# Patient Record
Sex: FEMALE | Race: WHITE | URBAN - NONMETROPOLITAN AREA
[De-identification: names, ages, dates, MRNs, and addresses within clinical notes are randomized per-mention and may not be internally consistent; named-entity substitution may affect disease eponyms.]

---

## 2017-10-24 ENCOUNTER — MEDICAL CORRESPONDENCE (OUTPATIENT)
Dept: HEALTH INFORMATION MANAGEMENT | Facility: HOSPITAL | Age: 72
End: 2017-10-24

## 2017-11-15 ENCOUNTER — TRANSFERRED RECORDS (OUTPATIENT)
Dept: HEALTH INFORMATION MANAGEMENT | Facility: HOSPITAL | Age: 72
End: 2017-11-15

## 2017-11-22 ENCOUNTER — MEDICAL CORRESPONDENCE (OUTPATIENT)
Dept: HEALTH INFORMATION MANAGEMENT | Facility: HOSPITAL | Age: 72
End: 2017-11-22

## 2017-11-28 ENCOUNTER — MEDICAL CORRESPONDENCE (OUTPATIENT)
Dept: HEALTH INFORMATION MANAGEMENT | Facility: HOSPITAL | Age: 72
End: 2017-11-28

## 2017-12-09 DIAGNOSIS — Z00.00 HEALTH CARE MAINTENANCE: Primary | ICD-10-CM

## 2017-12-12 ENCOUNTER — MEDICAL CORRESPONDENCE (OUTPATIENT)
Dept: HEALTH INFORMATION MANAGEMENT | Facility: HOSPITAL | Age: 72
End: 2017-12-12

## 2017-12-12 RX ORDER — MULTIVITAMIN WITH FOLIC ACID 400 MCG
TABLET ORAL
Qty: 30 TABLET | Refills: 3 | Status: SHIPPED | OUTPATIENT
Start: 2017-12-12 | End: 2018-01-09

## 2017-12-12 NOTE — TELEPHONE ENCOUNTER
Multi Vit  No office visit.  No medications on her med list.  I see you signed a home care and hospice order in media.  Please advise if you are filling medications for this patient.  Thank you.

## 2017-12-13 PROBLEM — Z79.4 TYPE 2 DIABETES MELLITUS WITH CHRONIC KIDNEY DISEASE ON CHRONIC DIALYSIS, WITH LONG-TERM CURRENT USE OF INSULIN (H): Status: ACTIVE | Noted: 2017-12-13

## 2017-12-13 PROBLEM — E78.2 MIXED HYPERLIPIDEMIA: Status: ACTIVE | Noted: 2017-12-13

## 2017-12-13 PROBLEM — N18.6 TYPE 2 DIABETES MELLITUS WITH CHRONIC KIDNEY DISEASE ON CHRONIC DIALYSIS, WITH LONG-TERM CURRENT USE OF INSULIN (H): Status: ACTIVE | Noted: 2017-12-13

## 2017-12-13 PROBLEM — I48.0 PAROXYSMAL ATRIAL FIBRILLATION (H): Status: ACTIVE | Noted: 2017-12-13

## 2017-12-13 PROBLEM — E03.9 ACQUIRED HYPOTHYROIDISM: Status: ACTIVE | Noted: 2017-12-13

## 2017-12-13 PROBLEM — I10 BENIGN ESSENTIAL HYPERTENSION: Status: ACTIVE | Noted: 2017-12-13

## 2017-12-13 PROBLEM — I25.10 CORONARY ARTERY DISEASE INVOLVING NATIVE CORONARY ARTERY OF NATIVE HEART WITHOUT ANGINA PECTORIS: Status: ACTIVE | Noted: 2017-12-13

## 2017-12-13 PROBLEM — Z99.2 TYPE 2 DIABETES MELLITUS WITH CHRONIC KIDNEY DISEASE ON CHRONIC DIALYSIS, WITH LONG-TERM CURRENT USE OF INSULIN (H): Status: ACTIVE | Noted: 2017-12-13

## 2017-12-13 PROBLEM — F41.1 GAD (GENERALIZED ANXIETY DISORDER): Status: ACTIVE | Noted: 2017-12-13

## 2017-12-13 PROBLEM — E11.22 TYPE 2 DIABETES MELLITUS WITH CHRONIC KIDNEY DISEASE ON CHRONIC DIALYSIS, WITH LONG-TERM CURRENT USE OF INSULIN (H): Status: ACTIVE | Noted: 2017-12-13

## 2017-12-13 PROBLEM — I50.41 ACUTE COMBINED SYSTOLIC AND DIASTOLIC HEART FAILURE (H): Status: ACTIVE | Noted: 2017-12-13

## 2017-12-13 PROBLEM — Z78.9 NURSING HOME RESIDENT: Status: ACTIVE | Noted: 2017-12-13

## 2017-12-13 PROBLEM — E66.01 MORBID OBESITY (H): Status: ACTIVE | Noted: 2017-12-13

## 2017-12-13 PROBLEM — G47.33 OSA (OBSTRUCTIVE SLEEP APNEA): Status: ACTIVE | Noted: 2017-12-13

## 2017-12-13 NOTE — PROGRESS NOTES
HISTORY OF PRESENT ILLNESS:  Delmis is a 72 year old female (1945)  resident of [facility]  who is being seen today for a routine 30 day follow up. She was admitted from [facility] after developing acute combined heart failure.  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  Extensive records are reviewed.. Patient offers no other complaint.  Staff notes no other issues.    Current medications, allergies, and interdisciplinary care plan are reviewed.      Patient Active Problem List    Diagnosis Date Noted     CHD (coronary heart disease) 12/13/2017     Priority: Medium     Paroxysmal atrial fibrillation (H) 12/13/2017     Priority: Medium     Acute combined systolic and diastolic heart failure (H) 12/13/2017     Priority: Medium     Type 2 diabetes mellitus with chronic kidney disease on chronic dialysis, with long-term current use of insulin (H) 12/13/2017     Priority: Medium     Acquired hypothyroidism 12/13/2017     Priority: Medium     Morbid obesity (H) 12/13/2017     Priority: Medium     MANDA (generalized anxiety disorder) 12/13/2017     Priority: Medium     WIN (obstructive sleep apnea) 12/13/2017     Priority: Medium     Benign essential hypertension 12/13/2017     Priority: Medium     Dyslipidemia 12/13/2017     Priority: Medium     Nursing Home Visit 12/13/2017     Priority: Low          Social History     Social History     Marital status:      Spouse name: N/A     Number of children: N/A     Years of education: N/A     Occupational History     Not on file.     Social History Main Topics     Smoking status: Not on file     Smokeless tobacco: Not on file     Alcohol use Not on file     Drug use: Not on file     Sexual activity: Not on file     Other Topics Concern     Not on file     Social History Narrative        Current Outpatient Prescriptions   Medication Sig     Multiple Vitamin  (TAB-A-BEKA) TABS TAKE 1 TABLET DAILY BY MOUTH     No current facility-administered medications for this visit.        Allergies not on file    I have reviewed the care plan and do agree with the plan.      ROS:  No chest pain, fever, chills, headache, nausea, vomiting, dysuria, or changes in bowel habits.  Appetite is normal.  No pain noted.          OBJECTIVE:  There were no vitals taken for this visit.    GENERAL:  Chronically ill appearing, alert, and in no acute distress  RESP:  Lungs clear.  No rales, rhonchi, or wheezing  CV:  IRRR.  S1 S2 without murmur. No clicks or rubs.  SKIN:  Age-related changes.  No suspicious lesions or rashes.  PSYCH:  Mentation intact, affect bright, and orientation not assessed.  EXTREM:  2+ edema.  Pulses palpable.          Lab/Diagnostic data:    Reviewed    ASSESSMENT/ORDERS:  Nursing Home Visit  Above issues stable.  No changes in current medications or care plan.      Total time spent with patient visit was 25 min including patient visit, review of pertinent clinical information, and treatment plan.      Alexi Youssef MD

## 2017-12-27 ENCOUNTER — NURSING HOME VISIT (OUTPATIENT)
Dept: FAMILY MEDICINE | Facility: OTHER | Age: 72
End: 2017-12-27
Payer: COMMERCIAL

## 2017-12-27 DIAGNOSIS — Z78.9 NURSING HOME RESIDENT: Primary | ICD-10-CM

## 2017-12-27 NOTE — MR AVS SNAPSHOT
"              After Visit Summary   2017    Delmis West    MRN: [id number]           Patient Information     Date Of Birth          1945        Visit Information        Provider Department      2017 4:37 AM Alexi Youssef MD [facility]        [facility]     Nursing Home Visit    -  1       Follow-ups after your visit        Who to contact     If you have questions or need follow up information about today's clinic visit or your schedule please contact [facility] directly at [phone].  Normal or non-critical lab and imaging results will be communicated to you by MyChart, letter or phone within 4 business days after the clinic has received the results. If you do not hear from us within 7 days, please contact the clinic through [employer] or phone. If you have a critical or abnormal lab result, we will notify you by phone as soon as possible.  Submit refill requests through [employer] or call your pharmacy and they will forward the refill request to us. Please allow 3 business days for your refill to be completed.          Additional Information About Your Visit        MyChart Information     [employer] lets you send messages to your doctor, view your test results, renew your prescriptions, schedule appointments and more. To sign up, go to [email or web address] . Click on \"Log in\" on the left side of the screen, which will take you to the Welcome page. Then click on \"Sign up Now\" on the right side of the page.     You will be asked to enter the access code listed below, as well as some personal information. Please follow the directions to create your username and password.     Your access code is: [email or web address]  Expires: 3/13/2018  4:51 AM     Your access code will  in 90 days. If you need help or a new code, please call your [facility] or [phone].        Care EveryWhere ID     This is your Care EveryWhere ID. This could be used by other " organizations to access your [place] medical records  [id number]         Blood Pressure from Last 3 Encounters:   No data found for BP    Weight from Last 3 Encounters:   No data found for Wt              Today, you had the following     No orders found for display       Primary Care Provider Office Phone # Fax #    Alexi Youssef [phone]       [facility] [address]  [facility] [address]        Equal Access to Services     [facility] : Hadii aad ku hadasho Soomaali, waaxda luqadaha, qaybta kaalmada adeegyada, waxay idiin hayaan adeeg kharash la'aan . So [facility] [phone].    ATENCIÓN: Si habla español, tiene a salinas disposición servicios gratuitos de asistencia lingüística. Llame al [phone].    We comply with applicable federal civil rights laws and [place] laws. We do not discriminate on the basis of race, color, national origin, age, disability, sex, sexual orientation, or gender identity.            Thank you!     Thank you for choosing [facility]  for your care. Our goal is always to provide you with excellent care. Hearing back from our patients is one way we can continue to improve our services. Please take a few minutes to complete the written survey that you may receive in the mail after your visit with us. Thank you!             Your Updated Medication List - Protect others around you: Learn how to safely use, store and throw away your medicines at www.disposemymeds.org.          This list is accurate as of: 12/13/17  4:51 AM.  Always use your most recent med list.                   Brand Name Dispense Instructions for use Diagnosis    TAB-A-BEKA Tabs     30 tablet    TAKE 1 TABLET DAILY BY MOUTH    Health care maintenance

## 2018-01-09 DIAGNOSIS — K21.9 GASTROESOPHAGEAL REFLUX DISEASE, ESOPHAGITIS PRESENCE NOT SPECIFIED: ICD-10-CM

## 2018-01-09 DIAGNOSIS — E03.9 ACQUIRED HYPOTHYROIDISM: ICD-10-CM

## 2018-01-09 DIAGNOSIS — I10 BENIGN ESSENTIAL HYPERTENSION: ICD-10-CM

## 2018-01-09 DIAGNOSIS — Z00.00 HEALTH CARE MAINTENANCE: ICD-10-CM

## 2018-01-09 DIAGNOSIS — E11.42 DIABETIC POLYNEUROPATHY ASSOCIATED WITH TYPE 2 DIABETES MELLITUS (H): ICD-10-CM

## 2018-01-09 DIAGNOSIS — I50.41 ACUTE COMBINED SYSTOLIC AND DIASTOLIC HEART FAILURE (H): ICD-10-CM

## 2018-01-09 DIAGNOSIS — I25.119 CORONARY ARTERY DISEASE WITH ANGINA PECTORIS, UNSPECIFIED VESSEL OR LESION TYPE, UNSPECIFIED WHETHER NATIVE OR TRANSPLANTED HEART (H): Primary | ICD-10-CM

## 2018-01-10 DIAGNOSIS — N18.6 TYPE 2 DIABETES MELLITUS WITH CHRONIC KIDNEY DISEASE ON CHRONIC DIALYSIS, WITHOUT LONG-TERM CURRENT USE OF INSULIN (H): Primary | ICD-10-CM

## 2018-01-10 DIAGNOSIS — Z99.2 TYPE 2 DIABETES MELLITUS WITH CHRONIC KIDNEY DISEASE ON CHRONIC DIALYSIS, WITHOUT LONG-TERM CURRENT USE OF INSULIN (H): Primary | ICD-10-CM

## 2018-01-10 DIAGNOSIS — E11.22 TYPE 2 DIABETES MELLITUS WITH CHRONIC KIDNEY DISEASE ON CHRONIC DIALYSIS, WITHOUT LONG-TERM CURRENT USE OF INSULIN (H): Primary | ICD-10-CM

## 2018-01-11 NOTE — TELEPHONE ENCOUNTER
Multiple vitamin     Last Written Prescription Date:  12/12/17  Last Fill Quantity: 30,   # refills: 3  Last Office Visit: 12/27/17, nursing home  Future Office visit: none           All other medications not in medication list      Last Written Prescription Date:    Last Fill Quantity: ,   # refills:   Last Office Visit: 12/27/17 Nursing home visit  Future Office visit:

## 2018-01-12 RX ORDER — CALCIUM ACETATE 667 MG/1
CAPSULE ORAL
Qty: 180 CAPSULE | Refills: 0 | Status: SHIPPED | OUTPATIENT
Start: 2018-01-12 | End: 2018-02-08

## 2018-01-12 RX ORDER — CLOPIDOGREL BISULFATE 75 MG/1
TABLET ORAL
Qty: 30 TABLET | Refills: 0 | Status: SHIPPED | OUTPATIENT
Start: 2018-01-12 | End: 2018-02-08

## 2018-01-12 RX ORDER — ASCORBIC ACID, THIAMINE, RIBOFLAVIN, NIACINAMIDE, PYRIDOXINE, FOLIC ACID, COBALAMIN, BIOTIN, PANTOTHENIC ACID 100; 1.5; 1.7; 20; 10; 1; 6; 300; 1 MG/1; MG/1; MG/1; MG/1; MG/1; MG/1; UG/1; UG/1; MG/1
TABLET, COATED ORAL
Qty: 30 TABLET | Refills: 0 | Status: SHIPPED | OUTPATIENT
Start: 2018-01-12 | End: 2018-02-08

## 2018-01-12 RX ORDER — PEN NEEDLE, DIABETIC, SAFETY 30 GX3/16"
NEEDLE, DISPOSABLE MISCELLANEOUS
Qty: 100 EACH | Refills: 0 | Status: SHIPPED | OUTPATIENT
Start: 2018-01-12

## 2018-01-12 RX ORDER — MULTIVITAMIN WITH FOLIC ACID 400 MCG
TABLET ORAL
Qty: 30 TABLET | Refills: 10 | Status: SHIPPED | OUTPATIENT
Start: 2018-01-12 | End: 2018-11-15

## 2018-01-12 RX ORDER — FUROSEMIDE 40 MG
TABLET ORAL
Qty: 60 TABLET | Refills: 0 | Status: SHIPPED | OUTPATIENT
Start: 2018-01-12

## 2018-01-12 RX ORDER — METOPROLOL SUCCINATE 100 MG/1
TABLET, EXTENDED RELEASE ORAL
Qty: 30 TABLET | Refills: 0 | Status: SHIPPED | OUTPATIENT
Start: 2018-01-12 | End: 2018-02-08

## 2018-01-12 RX ORDER — GABAPENTIN 100 MG/1
CAPSULE ORAL
Qty: 60 CAPSULE | Refills: 0 | Status: SHIPPED | OUTPATIENT
Start: 2018-01-12 | End: 2018-02-08

## 2018-01-12 RX ORDER — LEVOTHYROXINE SODIUM 125 UG/1
TABLET ORAL
Qty: 30 TABLET | Refills: 0 | Status: SHIPPED | OUTPATIENT
Start: 2018-01-12 | End: 2018-02-08

## 2018-01-12 NOTE — TELEPHONE ENCOUNTER
BD autoshield duo      Last Written Prescription Date:  Not on medication list  Last Fill Quantity: ,   # refills:   Last Office Visit: 12/27/17  Future Office visit: none

## 2018-01-22 DIAGNOSIS — E78.2 MIXED HYPERLIPIDEMIA: Primary | ICD-10-CM

## 2018-01-22 RX ORDER — ATORVASTATIN CALCIUM 40 MG/1
TABLET, FILM COATED ORAL
Qty: 30 TABLET | Refills: 0 | Status: SHIPPED | OUTPATIENT
Start: 2018-01-22 | End: 2018-02-13

## 2018-01-22 NOTE — TELEPHONE ENCOUNTER
Lipitor  Last Written Prescription Date:  Not on patient's current med list  Last Fill Qty:  Unknown, # Refills:  0  Last Office Visit:  12/27/17 Nursing home visit

## 2018-01-29 DIAGNOSIS — I10 ESSENTIAL HYPERTENSION: Primary | ICD-10-CM

## 2018-01-29 RX ORDER — METOPROLOL SUCCINATE 50 MG/1
TABLET, EXTENDED RELEASE ORAL
Qty: 30 TABLET | Refills: 0 | Status: SHIPPED | OUTPATIENT
Start: 2018-01-29 | End: 2018-02-28

## 2018-01-29 NOTE — TELEPHONE ENCOUNTER
Toprol XL 50mg  Request form pharmacy.Not on current medication list.Last nursing home visit on 12.27.17. Medication pended.    Order is for a total of 150mg po daily.    Thank you,  Little Jara

## 2018-02-08 DIAGNOSIS — I10 BENIGN ESSENTIAL HYPERTENSION: ICD-10-CM

## 2018-02-08 DIAGNOSIS — I25.119 CORONARY ARTERY DISEASE WITH ANGINA PECTORIS, UNSPECIFIED VESSEL OR LESION TYPE, UNSPECIFIED WHETHER NATIVE OR TRANSPLANTED HEART (H): ICD-10-CM

## 2018-02-08 DIAGNOSIS — E11.42 DIABETIC POLYNEUROPATHY ASSOCIATED WITH TYPE 2 DIABETES MELLITUS (H): ICD-10-CM

## 2018-02-08 DIAGNOSIS — E03.9 ACQUIRED HYPOTHYROIDISM: ICD-10-CM

## 2018-02-08 DIAGNOSIS — Z00.00 HEALTH CARE MAINTENANCE: ICD-10-CM

## 2018-02-09 RX ORDER — CLOPIDOGREL BISULFATE 75 MG/1
TABLET ORAL
Qty: 30 TABLET | Refills: 5 | Status: SHIPPED | OUTPATIENT
Start: 2018-02-09

## 2018-02-09 RX ORDER — METOPROLOL SUCCINATE 100 MG/1
TABLET, EXTENDED RELEASE ORAL
Qty: 30 TABLET | Refills: 0 | Status: SHIPPED | OUTPATIENT
Start: 2018-02-09 | End: 2018-03-20

## 2018-02-09 RX ORDER — GABAPENTIN 100 MG/1
CAPSULE ORAL
Qty: 60 CAPSULE | Refills: 0 | Status: SHIPPED | OUTPATIENT
Start: 2018-02-09 | End: 2018-03-19

## 2018-02-09 RX ORDER — LEVOTHYROXINE SODIUM 125 UG/1
TABLET ORAL
Qty: 30 TABLET | Refills: 0 | Status: SHIPPED | OUTPATIENT
Start: 2018-02-09 | End: 2018-03-19

## 2018-02-09 RX ORDER — ASCORBIC ACID, THIAMINE, RIBOFLAVIN, NIACINAMIDE, PYRIDOXINE, FOLIC ACID, COBALAMIN, BIOTIN, PANTOTHENIC ACID 100; 1.5; 1.7; 20; 10; 1; 6; 300; 1 MG/1; MG/1; MG/1; MG/1; MG/1; MG/1; UG/1; UG/1; MG/1
TABLET, COATED ORAL
Qty: 30 TABLET | Refills: 5 | Status: SHIPPED | OUTPATIENT
Start: 2018-02-09

## 2018-02-09 RX ORDER — CALCIUM ACETATE 667 MG/1
CAPSULE ORAL
Qty: 180 CAPSULE | Refills: 0 | Status: SHIPPED | OUTPATIENT
Start: 2018-02-09 | End: 2018-02-26

## 2018-02-09 NOTE — TELEPHONE ENCOUNTER
B Complex C Folic Acid      Last Written Prescription Date:  1/12/18  Last Fill Quantity: 30tab,   # refills: 0  Last Office Visit: 12/27/17  Future Office visit:       Calcium Acetate      Last Written Prescription Date:  1/12/18  Last Fill Quantity: 180caps,   # refills: 0  Last Office Visit: 12/27/17  Future Office visit:       Plavix      Last Written Prescription Date:  1/12/18  Last Fill Quantity: 30tab,   # refills: 0  Last Office Visit: 12/27/17  Future Office visit:       Gabapentin      Last Written Prescription Date:  1/12/18  Last Fill Quantity: 60caps,   # refills: 0  Last Office Visit: 12/27/17  Future Office visit:       Levothyroxine      Last Written Prescription Date:  1/12/18  Last Fill Quantity: 30tab,   # refills: 0  Last Office Visit: 12/27/17  Future Office visit:       .Metoprolol      Last Written Prescription Date:  1/29/18  Last Fill Quantity: 30tab,   # refills: 0  Last Office Visit: 12/27/17  Future Office visit:

## 2018-02-09 NOTE — TELEPHONE ENCOUNTER
No TSH in EPIC file. Please advise. Levothyroxine pended.    No blood pressure documented in EPIC. Please advise. Metoprolol pended.    Nursing home patient.    PCP Dr. Youssef.

## 2018-02-19 DIAGNOSIS — E10.65 TYPE 1 DIABETES MELLITUS WITH HYPERGLYCEMIA (H): Primary | ICD-10-CM

## 2018-02-20 RX ORDER — INSULIN GLARGINE 100 [IU]/ML
INJECTION, SOLUTION SUBCUTANEOUS
Qty: 15 ML | Refills: 0 | Status: SHIPPED | OUTPATIENT
Start: 2018-02-20 | End: 2018-04-06

## 2018-02-26 DIAGNOSIS — Z00.00 HEALTH CARE MAINTENANCE: ICD-10-CM

## 2018-02-28 DIAGNOSIS — I10 ESSENTIAL HYPERTENSION: ICD-10-CM

## 2018-02-28 RX ORDER — CALCIUM ACETATE 667 MG/1
CAPSULE ORAL
Qty: 180 CAPSULE | Refills: 5 | Status: SHIPPED | OUTPATIENT
Start: 2018-02-28

## 2018-03-01 RX ORDER — METOPROLOL SUCCINATE 50 MG/1
TABLET, EXTENDED RELEASE ORAL
Qty: 30 TABLET | Refills: 5 | Status: SHIPPED | OUTPATIENT
Start: 2018-03-01

## 2018-03-06 DIAGNOSIS — Z00.00 HEALTH CARE MAINTENANCE: Primary | ICD-10-CM

## 2018-03-06 NOTE — TELEPHONE ENCOUNTER
Vitamin D  Last Written Prescription Date: unknown, not prescribed here  Last Fill Quantity: unknown # of Refills: unknown  Last Office Visit: 12/27/17

## 2018-03-07 RX ORDER — CHOLECALCIFEROL (VITAMIN D3) 50 MCG
TABLET ORAL
Qty: 100 TABLET | Refills: 2 | Status: SHIPPED | OUTPATIENT
Start: 2018-03-07 | End: 2019-03-11

## 2018-03-19 DIAGNOSIS — E03.9 ACQUIRED HYPOTHYROIDISM: ICD-10-CM

## 2018-03-19 DIAGNOSIS — E11.42 DIABETIC POLYNEUROPATHY ASSOCIATED WITH TYPE 2 DIABETES MELLITUS (H): ICD-10-CM

## 2018-03-20 DIAGNOSIS — I10 BENIGN ESSENTIAL HYPERTENSION: ICD-10-CM

## 2018-03-20 RX ORDER — LEVOTHYROXINE SODIUM 125 UG/1
TABLET ORAL
Qty: 30 TABLET | Refills: 0 | Status: SHIPPED | OUTPATIENT
Start: 2018-03-20

## 2018-03-20 RX ORDER — GABAPENTIN 100 MG/1
CAPSULE ORAL
Qty: 60 CAPSULE | Refills: 0 | Status: SHIPPED | OUTPATIENT
Start: 2018-03-20 | End: 2018-04-19

## 2018-03-20 NOTE — TELEPHONE ENCOUNTER
Please see note below for Gabapentin and Levothyroxine.  No TSH labs on file.  Please advise.  Thank you.

## 2018-03-20 NOTE — TELEPHONE ENCOUNTER
gabapentin (NEURONTIN) 100 MG capsule      Last Written Prescription Date:  2/9/18  Last Fill Quantity: 60,   # refills: 0  Last Office Visit: 12/27/17  Future Office visit:       Synthroid       Last Written Prescription Date:  2/9/18  Last Fill Quantity: 30,   # refills: 0  Last Office Visit: 12/27/17  Future Office visit:

## 2018-03-21 NOTE — TELEPHONE ENCOUNTER
metoprolol succinate (TOPROL-XL)      Last Written Prescription Date:  03/20/2018  Last Fill Quantity: 30,   # refills: 5  Last Office Visit: 12/27/2017 Nursing home visit/Jan[facility]  Future Office visit:  none

## 2018-03-22 RX ORDER — METOPROLOL SUCCINATE 100 MG/1
TABLET, EXTENDED RELEASE ORAL
Qty: 30 TABLET | Refills: 1 | Status: SHIPPED | OUTPATIENT
Start: 2018-03-22

## 2018-04-06 DIAGNOSIS — E10.65 TYPE 1 DIABETES MELLITUS WITH HYPERGLYCEMIA (H): ICD-10-CM

## 2018-04-06 NOTE — TELEPHONE ENCOUNTER
robby      Last Written Prescription Date:  2/20/18  Last Fill Quantity: 15 ml,   # refills: 0  Last Office Visit: 12/27/17  Future Office visit: none

## 2018-04-09 RX ORDER — INSULIN GLARGINE 100 [IU]/ML
INJECTION, SOLUTION SUBCUTANEOUS
Qty: 15 ML | Refills: 11 | Status: SHIPPED | OUTPATIENT
Start: 2018-04-09

## 2018-04-09 NOTE — TELEPHONE ENCOUNTER
Please advise on Lantus.  No labs found on file.  NH visit 12/17  Thank you.      Blood pressure less than 140/90 in past 6 months       BP Readings from Last 3 Encounters:   No data found for BP                      LDL on file in past 12 months       No lab results found.               Microalbumin on file in past 12 months       No lab results found.               Serum creatinine on file in past 12 months       No lab results found.               HgbA1C in past 3 or 6 months       No lab results found.

## 2018-04-19 DIAGNOSIS — E11.42 DIABETIC POLYNEUROPATHY ASSOCIATED WITH TYPE 2 DIABETES MELLITUS (H): ICD-10-CM

## 2018-04-20 RX ORDER — GABAPENTIN 100 MG/1
CAPSULE ORAL
Qty: 60 CAPSULE | Refills: 0 | Status: SHIPPED | OUTPATIENT
Start: 2018-04-20

## 2018-04-20 NOTE — TELEPHONE ENCOUNTER
Gabapentin  Last Written Prescription Date:  3/20/18  Last Fill Qty:  60, # Refills:  0  Last Office Visit:  12/27/18 Nursing Home Visit

## 2018-05-21 DIAGNOSIS — G89.4 CHRONIC PAIN SYNDROME: Primary | ICD-10-CM

## 2018-05-22 NOTE — TELEPHONE ENCOUNTER
Last visit: 12.27.17 Nursing home visit  Last refill: not on med list.  Please advise.  PCP Domonique

## 2019-01-24 DIAGNOSIS — J43.2 CENTRILOBULAR EMPHYSEMA (H): Primary | ICD-10-CM

## 2019-01-25 RX ORDER — ALBUTEROL SULFATE 90 UG/1
AEROSOL, METERED RESPIRATORY (INHALATION)
Qty: 8.5 G | Refills: 0 | Status: SHIPPED | OUTPATIENT
Start: 2019-01-25

## 2019-02-13 DIAGNOSIS — Z00.00 HEALTH CARE MAINTENANCE: ICD-10-CM

## 2019-02-13 RX ORDER — MULTIVITAMIN WITH FOLIC ACID 400 MCG
TABLET ORAL
Qty: 30 TABLET | Refills: 0 | Status: SHIPPED | OUTPATIENT
Start: 2019-02-13 | End: 2019-03-18

## 2019-02-13 NOTE — TELEPHONE ENCOUNTER
Multiple vitamin      Last Written Prescription Date:  1/14/19  Last Fill Quantity: 30,   # refills: 0  Last Office Visit: 12/27/17 nursing home visit

## 2019-02-13 NOTE — TELEPHONE ENCOUNTER
Protocol failed due to    Recent (12 mo) or future (30 days) visit within the authorizing provider's specialty     Please advise. Thank you!

## 2019-03-11 DIAGNOSIS — Z00.00 HEALTH CARE MAINTENANCE: ICD-10-CM

## 2019-03-13 RX ORDER — CHOLECALCIFEROL (VITAMIN D3) 50 MCG
TABLET ORAL
Qty: 100 TABLET | Refills: 0 | Status: SHIPPED | OUTPATIENT
Start: 2019-03-13 | End: 2019-06-18

## 2019-04-19 DIAGNOSIS — Z00.00 HEALTH CARE MAINTENANCE: ICD-10-CM

## 2019-04-22 NOTE — TELEPHONE ENCOUNTER
Multi vitamin      Last Written Prescription Date:  3/20/19  Last Fill Quantity: 30,   # refills: 0  Nursing home visit: 12/27/17  Future Office visit:       Routing refill request to provider for review/approval because:

## 2019-04-23 RX ORDER — MULTIVITAMIN WITH FOLIC ACID 400 MCG
TABLET ORAL
Qty: 30 TABLET | Refills: 0 | Status: SHIPPED | OUTPATIENT
Start: 2019-04-23

## 2019-05-22 DIAGNOSIS — J43.2 CENTRILOBULAR EMPHYSEMA (H): Primary | ICD-10-CM

## 2019-05-22 NOTE — TELEPHONE ENCOUNTER
acetaminophen (TYLENOL) 325 MG tablet   Last Written Prescription Date:  Med not on list   Last Fill Quantity: 0,   # refills: 0  Last Office Visit: 12/27/17  Future Office visit:

## 2019-05-23 RX ORDER — ACETAMINOPHEN 325 MG/1
TABLET ORAL
Qty: 100 TABLET | Refills: 0 | Status: SHIPPED | OUTPATIENT
Start: 2019-05-23

## 2019-06-18 DIAGNOSIS — Z00.00 HEALTH CARE MAINTENANCE: ICD-10-CM

## 2019-06-19 RX ORDER — CHOLECALCIFEROL (VITAMIN D3) 50 MCG
TABLET ORAL
Qty: 100 TABLET | Refills: 0 | Status: SHIPPED | OUTPATIENT
Start: 2019-06-19

## 2019-07-01 DIAGNOSIS — J43.2 CENTRILOBULAR EMPHYSEMA (H): Primary | ICD-10-CM

## 2019-07-02 RX ORDER — IPRATROPIUM BROMIDE AND ALBUTEROL SULFATE 2.5; .5 MG/3ML; MG/3ML
SOLUTION RESPIRATORY (INHALATION)
Qty: 180 ML | Refills: 0 | Status: SHIPPED | OUTPATIENT
Start: 2019-07-02

## 2019-07-02 NOTE — TELEPHONE ENCOUNTER
ipratropium - albuterol 0.5 mg/2.5 mg/3 mL (DUONEB) 0.5-2.5 (3) MG/3ML neb solution      Last Written Prescription Date:  Not on list  Last Fill Quantity: -,   # refills: -  Last Office Visit: 12/27/17  Future Office visit:       Routing refill request to provider for review/approval because:  NH pt? Please advise. Thank you!

## 2019-07-02 NOTE — TELEPHONE ENCOUNTER
Ipatropium-Albuterol      Last Written Prescription Date:  historical  Last Fill Quantity: 0,   # refills: 0  Last Office Visit: 12/27/2017  Future Office visit:       Routing refill request to provider for review/approval because:  Drug not on the [facility], [facility] or [facility] refill protocol or controlled substance  Drug not active on patient's medication list

## 2020-01-01 DIAGNOSIS — G89.4 CHRONIC PAIN SYNDROME: Primary | ICD-10-CM

## 2020-01-01 RX ORDER — DOCUSATE SODIUM 50MG AND SENNOSIDES 8.6MG 8.6; 5 MG/1; MG/1
TABLET, FILM COATED ORAL
Qty: 100 TABLET | Refills: 0 | Status: SHIPPED | OUTPATIENT
Start: 2020-01-01